# Patient Record
Sex: FEMALE | Race: WHITE | Employment: OTHER | URBAN - METROPOLITAN AREA
[De-identification: names, ages, dates, MRNs, and addresses within clinical notes are randomized per-mention and may not be internally consistent; named-entity substitution may affect disease eponyms.]

---

## 2018-01-28 PROBLEM — M25.551 PAIN OF RIGHT HIP JOINT: Status: ACTIVE | Noted: 2018-01-28

## 2018-01-28 PROBLEM — M79.604 RIGHT LEG PAIN: Status: ACTIVE | Noted: 2018-01-28

## 2018-01-28 PROBLEM — M25.561 ACUTE PAIN OF RIGHT KNEE: Status: ACTIVE | Noted: 2018-01-28

## 2018-11-15 ENCOUNTER — HOSPITAL ENCOUNTER (OUTPATIENT)
Age: 78
Discharge: HOME | End: 2018-11-17
Payer: MEDICARE

## 2018-11-15 LAB
ALBUMIN SERPL-MCNC: 4.1 G/DL (ref 3.5–5.2)
ALP BLD-CCNC: 138 U/L (ref 35–104)
ALT SERPL-CCNC: 33 U/L (ref 0–32)
ANION GAP SERPL CALCULATED.3IONS-SCNC: 15 MMOL/L (ref 7–16)
AST SERPL-CCNC: 41 U/L (ref 0–31)
BILIRUB SERPL-MCNC: 1.3 MG/DL (ref 0–1.2)
BUN BLDV-MCNC: 17 MG/DL (ref 8–23)
CALCIUM SERPL-MCNC: 9.4 MG/DL (ref 8.6–10.2)
CHLORIDE BLD-SCNC: 102 MMOL/L (ref 98–107)
CHOLESTEROL, TOTAL: 110 MG/DL (ref 0–199)
CO2: 25 MMOL/L (ref 22–29)
CREAT SERPL-MCNC: 1.3 MG/DL (ref 0.5–1)
GFR AFRICAN AMERICAN: 48
GFR NON-AFRICAN AMERICAN: 40 ML/MIN/1.73
GLUCOSE BLD-MCNC: 135 MG/DL (ref 74–99)
HBA1C MFR BLD: 7.8 % (ref 4–5.6)
HDLC SERPL-MCNC: 31 MG/DL
LDL CHOLESTEROL CALCULATED: 48 MG/DL (ref 0–99)
POTASSIUM SERPL-SCNC: 4.8 MMOL/L (ref 3.5–5)
SODIUM BLD-SCNC: 142 MMOL/L (ref 132–146)
TOTAL PROTEIN: 7.3 G/DL (ref 6.4–8.3)
TRIGL SERPL-MCNC: 154 MG/DL (ref 0–149)
VLDLC SERPL CALC-MCNC: 31 MG/DL

## 2018-11-15 PROCEDURE — 80061 LIPID PANEL: CPT

## 2018-11-15 PROCEDURE — 80053 COMPREHEN METABOLIC PANEL: CPT

## 2018-11-15 PROCEDURE — 83036 HEMOGLOBIN GLYCOSYLATED A1C: CPT

## 2019-05-10 ENCOUNTER — HOSPITAL ENCOUNTER (OUTPATIENT)
Age: 79
Discharge: HOME | End: 2019-05-12
Payer: MEDICARE

## 2019-05-10 PROCEDURE — 80053 COMPREHEN METABOLIC PANEL: CPT

## 2019-05-10 PROCEDURE — 80061 LIPID PANEL: CPT

## 2019-05-10 PROCEDURE — 83036 HEMOGLOBIN GLYCOSYLATED A1C: CPT

## 2019-05-10 PROCEDURE — 84443 ASSAY THYROID STIM HORMONE: CPT

## 2019-05-10 PROCEDURE — 85025 COMPLETE CBC W/AUTO DIFF WBC: CPT

## 2019-06-06 ENCOUNTER — HOSPITAL ENCOUNTER (EMERGENCY)
Age: 79
Discharge: HOME | End: 2019-06-07
Attending: EMERGENCY MEDICINE
Payer: MEDICARE

## 2019-06-06 ENCOUNTER — APPOINTMENT (OUTPATIENT)
Dept: GENERAL RADIOLOGY | Age: 79
End: 2019-06-06
Payer: MEDICARE

## 2019-06-06 VITALS
HEIGHT: 62 IN | TEMPERATURE: 98 F | HEART RATE: 68 BPM | DIASTOLIC BLOOD PRESSURE: 74 MMHG | OXYGEN SATURATION: 94 % | BODY MASS INDEX: 36.8 KG/M2 | WEIGHT: 200 LBS | SYSTOLIC BLOOD PRESSURE: 152 MMHG | RESPIRATION RATE: 18 BRPM

## 2019-06-06 DIAGNOSIS — S42.201A CLOSED FRACTURE OF PROXIMAL END OF RIGHT HUMERUS, UNSPECIFIED FRACTURE MORPHOLOGY, INITIAL ENCOUNTER: ICD-10-CM
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
## 2019-06-06 PROCEDURE — 73564 X-RAY EXAM KNEE 4 OR MORE: CPT

## 2019-06-06 PROCEDURE — 73090 X-RAY EXAM OF FOREARM: CPT

## 2019-06-06 PROCEDURE — 99284 EMERGENCY DEPT VISIT MOD MDM: CPT

## 2019-06-06 PROCEDURE — 73552 X-RAY EXAM OF FEMUR 2/>: CPT

## 2019-06-06 PROCEDURE — 73502 X-RAY EXAM HIP UNI 2-3 VIEWS: CPT

## 2019-06-06 PROCEDURE — 73080 X-RAY EXAM OF ELBOW: CPT

## 2019-06-06 PROCEDURE — 73030 X-RAY EXAM OF SHOULDER: CPT

## 2019-06-06 PROCEDURE — 71045 X-RAY EXAM CHEST 1 VIEW: CPT

## 2019-06-06 PROCEDURE — 73060 X-RAY EXAM OF HUMERUS: CPT

## 2019-06-06 RX ORDER — ACETAMINOPHEN 325 MG/1
650 TABLET ORAL EVERY 6 HOURS PRN
Qty: 120 TABLET | Refills: 3 | Status: SHIPPED | OUTPATIENT
Start: 2019-06-06

## 2019-06-06 RX ORDER — GLIPIZIDE 10 MG/1
10 TABLET ORAL
COMMUNITY

## 2019-06-06 RX ORDER — GABAPENTIN 300 MG/1
300 CAPSULE ORAL 2 TIMES DAILY
COMMUNITY

## 2019-06-06 ASSESSMENT — PAIN DESCRIPTION - PAIN TYPE: TYPE: ACUTE PAIN

## 2019-06-06 ASSESSMENT — PAIN SCALES - GENERAL: PAINLEVEL_OUTOF10: 8

## 2019-06-06 ASSESSMENT — PAIN DESCRIPTION - ORIENTATION: ORIENTATION: RIGHT

## 2019-06-06 ASSESSMENT — PAIN DESCRIPTION - LOCATION: LOCATION: ARM

## 2019-06-06 ASSESSMENT — PAIN DESCRIPTION - DESCRIPTORS: DESCRIPTORS: ACHING;SHARP;THROBBING

## 2019-06-06 ASSESSMENT — PAIN DESCRIPTION - FREQUENCY: FREQUENCY: CONTINUOUS

## 2019-06-07 NOTE — ED PROVIDER NOTES
Department of Emergency Medicine   ED  Provider Note  Admit Date/RoomTime: 6/6/2019  9:30 PM  ED Room: CYNDI/CYNDI                  HPI:  6/7/19, Time: 2:54 AM  .       Maya Madden is a 66 y.o. female presenting to the ED as a fall from home, brought in by family. Pt has prior history of CVA with decreased sensation and no meaningful motor function of the right arm. She is able to ambulate but has fragile stability on her right leg which at baseline is very weak per son and daughter. At 4 PM today pt had a fall in her bathroom heard by her . Pt states she tripped and fell onto her right side. She denies hitting her head, denies LOC, denies thinners. Son reports his father told him she was awake, alert, and at her neurologic baseline when he quickly was at her side. She was complaining of right arm pain and right hip pain. She states her pain is moderate. Denies new numbness or tingling in the right arm and family notes at baseline pt has minimal use of the arm. This patients disposition will be determined by trauma services. Glascow Coma Scale at time of initial examination  Best Eye Response 4 - Opens eyes on own   Best Verbal Response 4 - Seems confused, disoriented (AO x 1 only but this baseline per family)   Best Motor Response 6 - Follows simple motor commands   Total 14         Review of Systems:   Pertinent positives and negatives are stated within HPI, all other systems reviewed and are negative.              --------------------------------------------- PAST HISTORY ---------------------------------------------  Past Medical History:  has a past medical history of Cerebral artery occlusion with [facility] CHF (congestive heart failure) ([facility] Diabetes mellitus ([facility] Hypertension, and MRSA (methicillin resistant staph aureus) culture positive. Past Surgical History:  has a past surgical history that includes Cholecystectomy and Cardiac surgery.     Social History:  reports that she has never smoked. She has never used smokeless tobacco. She reports that she does not drink alcohol or use drugs. Family History: family history is not on file. The patients home medications have been reviewed. Allergies: Pcn [penicillins]; Shellfish-derived products; and Sulfa antibiotics            ------------------------- NURSING NOTES AND VITALS REVIEWED ---------------------------   The nursing notes within the ED encounter and vital signs as below have been reviewed. BP (!) 152/74   Pulse 68   Temp 98 °F (36.7 °C) (Oral)   Resp 18   Ht 5' 2\" (1.575 m)   Wt 200 lb (90.7 kg)   SpO2 94%   BMI 36.58 kg/m²   Oxygen Saturation Interpretation: Normal    The patients available past medical records and past encounters were reviewed. -------------------------------------------------- RESULTS -------------------------------------------------  All laboratory and radiology tests have been reviewed by myself  LABS:  No results found for this visit on 06/06/19. RADIOLOGY:  Interpreted by Radiologist.  XR KNEE RIGHT (MIN 4 VIEWS)   Final Result   Posterior to changes in the right knee. No right knee   joint effusion. No acute fractures or dislocation in the right knee. XR HUMERUS RIGHT (MIN 2 VIEWS)   Final Result   Acute comminuted mildly impacted fracture proximal right   tibial shaft extending subcapital regions. XR RADIUS ULNA RIGHT (2 VIEWS)   Final Result   Unremarkable x-ray series of the right radius and ulna. The present study does not evaluate the entire the elbow joint or of   the recent joint. XR SHOULDER RIGHT (MIN 2 VIEWS)   Final Result   1. Acute comminuted the mildly complex fracture of the proximal right   humeral shaft extending subcapital regions. 2. As cannot see well the position of the humeral head in relation to   the glenoid can correlate with a CT scan of the right shoulder. ALERT:  ABNORMAL REPORT.       XR FEMUR RIGHT (MIN 2 VIEWS)   Final Result   Previous ORIF for a now healed fracture of the intertrochanteric   region. Orthopedic hardware is intact and there is no loosening. No new acute fractures in the right femur. XR HIP RIGHT (2-3 VIEWS)   Final Result   No acute fractures or dislocation of the right hip joint. XR ELBOW RIGHT (MIN 3 VIEWS)   Final Result      1. Limited exam for detection of nondisplaced fracture due to   generalized osteopenia. 2. No obvious displaced fracture. If clinically warranted, CT may be   helpful for further evaluation. XR CHEST 1 VW   Final Result      No airspace opacities or pleural effusion.                    ---------------------------------------------------PHYSICAL EXAM--------------------------------------      Primary Survey:  Airway: patient, trachea midline,   Breathing: Spontaneous, breath sounds equal bilaterally, symmetric chest rise  Circulation: 2+ femoral pulses, 2+ DP/PT pulses  Disability: GCS 14 (at baseline per family)      Constitutional/General: Alert and oriented x self only, baseline per family  Head: Normocephalic, atraumatic  Eyes: PERRL, EOMI, globes intact, no hyphema, no evidence of entrapment, conjunctiva pink  Ears: TMs clear with no hemotympanum  Mouth: Oropharynx clear, handling secretions, no trismus. No dental trauma, no oral trauma  Neck:  No crepitus, no lacerations, abrasions, deformities, or stepoffs. Back: No midline cervical, thoracic, lumbar spine tenderness. No Stepoffs, abrasions, lacerations, or deformities. Pulmonary: Lungs clear to auscultation bilaterally, no wheezes, rales, or rhonchi. Not in respiratory distress  Cardiovascular:  Regular rate and rhythm, no murmurs, gallops, or rubs. 2+ distal pulses  Chest: no chest wall tenderness, no crepitus  Abdomen: Soft, non tender, non distended, +BS, no rebound, guarding, or rigidity. No pulsatile masses appreciated  Extremities: Moves left extremities well. Warm and well perfused, no clubbing, cyanosis, or edema. Capillary refill <3 seconds  -RUE - pt has only 1/5 strength in RUE, baseline per family, moderate TTP prox arm, limited ROM of RUE 2/2 muscle contracture and pain, decreased sensation throughout which is also baseline per family, 2+ radial pulse, mild tenderness to deep palpation of the right elbow and prox forearm, no TTP wrist or snuffbox  -RLE mild TTP knee, 4+/5 strength in RLE, otherwise grossly normal RLE exam      Skin: warm and dry without rash  Neurologic: GCS 14 (baseline), CN 2-12 grossly intact, no focal deficits, symmetric strength 5/5 in the upper and lower extremities bilaterally  Psych: Normal Affect    Trauma Evaluation/Survey Conducted in accordance with ATLS Guidelines      ------------------------------ ED COURSE/MEDICAL DECISION MAKING----------------------  Medications - No data to display      Medical Decision Making:    Pt had fall from standing as described above. At neurologic baseline per family. Complaining of various MSK pain. Concern for right elbow/humeral fracture based on severity of pain with palpation. Patient with x-rays of the right knee, right humerus, right radius and ulna, right shoulder, 1 view chest, right hip including pelvis since she fell on her right side and this completed partial trauma workup. Pt has multiple chronic findings and sub optimal images due to inability to move RUE at baseline. XR right humerus showed  Acute comminuted mildly impacted fracture proximal right   tibial shaft extending subcapital regions. Pt is in minimal pain. Continues to smile and be in no acute distress. I had a discussion with the family regarding potentially calling orthopedics or during the patient hospital given that she is frail, had a fall, and now has an arm fracture. They state that because she minimally uses his right arm at baseline, and her  takes care of her at home for the patient's stay at home. They're agreeable to call orthopedics tomorrow and arrange follow-up. Given that her pain is under control, and she had minimal use of this arm already I am agreeable to this plan. Patient was given a sling here, Tylenol for home, orthopedics follow-up. Encouraged to return if symptoms worsen. This patient's ED course included: a personal history and physicial examination, re-evaluation prior to disposition, multiple bedside re-evaluations and complex medical decision making and emergency management    This patient has remained hemodynamically stable during their ED course. Counseling: The emergency provider has spoken with the patient and daughter and mother and discussed todays results, in addition to providing specific details for the plan of care and counseling regarding the diagnosis and prognosis. Questions are answered at this time and they are agreeable with the plan.       --------------------------------- IMPRESSION AND DISPOSITION ---------------------------------    IMPRESSION  1. Fall from standing, initial encounter    2.  Closed fracture of proximal end of right humerus, unspecified fracture morphology, initial encounter        DISPOSITION  Disposition: as per consultation   Patient condition is stable           Sheree Hall MD  06/07/19 1158

## 2019-06-14 ENCOUNTER — TELEPHONE (OUTPATIENT)
Dept: ORTHOPEDIC SURGERY | Age: 79
End: 2019-06-14

## 2019-06-14 DIAGNOSIS — T14.8XXA FRACTURE: Primary | ICD-10-CM

## 2019-10-23 ENCOUNTER — HOSPITAL ENCOUNTER (OUTPATIENT)
Age: 79
Discharge: HOME | End: 2019-10-25
Payer: MEDICARE

## 2019-10-23 LAB
ALBUMIN SERPL-MCNC: 3.8 G/DL (ref 3.5–5.2)
ALP BLD-CCNC: 137 U/L (ref 35–104)
ALT SERPL-CCNC: 35 U/L (ref 0–32)
ANION GAP SERPL CALCULATED.3IONS-SCNC: 13 MMOL/L (ref 7–16)
AST SERPL-CCNC: 38 U/L (ref 0–31)
BILIRUB SERPL-MCNC: 0.9 MG/DL (ref 0–1.2)
BUN BLDV-MCNC: 23 MG/DL (ref 8–23)
CALCIUM SERPL-MCNC: 9.7 MG/DL (ref 8.6–10.2)
CHLORIDE BLD-SCNC: 98 MMOL/L (ref 98–107)
CHOLESTEROL, TOTAL: 144 MG/DL (ref 0–199)
CO2: 27 MMOL/L (ref 22–29)
CREAT SERPL-MCNC: 1.2 MG/DL (ref 0.5–1)
GFR AFRICAN AMERICAN: 52
GFR NON-AFRICAN AMERICAN: 43 ML/MIN/1.73
GLUCOSE BLD-MCNC: 88 MG/DL (ref 74–99)
HBA1C MFR BLD: 7.8 % (ref 4–5.6)
HDLC SERPL-MCNC: 46 MG/DL
LDL CHOLESTEROL CALCULATED: 74 MG/DL (ref 0–99)
POTASSIUM SERPL-SCNC: 4.7 MMOL/L (ref 3.5–5)
SODIUM BLD-SCNC: 138 MMOL/L (ref 132–146)
TOTAL PROTEIN: 7.8 G/DL (ref 6.4–8.3)
TRIGL SERPL-MCNC: 121 MG/DL (ref 0–149)
VLDLC SERPL CALC-MCNC: 24 MG/DL

## 2019-10-23 PROCEDURE — 80061 LIPID PANEL: CPT

## 2019-10-23 PROCEDURE — 83036 HEMOGLOBIN GLYCOSYLATED A1C: CPT

## 2019-10-23 PROCEDURE — 80053 COMPREHEN METABOLIC PANEL: CPT

## 2021-01-01 ENCOUNTER — HOSPITAL ENCOUNTER (EMERGENCY)
Age: 81
Discharge: HOME | End: 2021-12-30
Attending: EMERGENCY MEDICINE
Payer: MEDICARE

## 2021-01-01 ENCOUNTER — APPOINTMENT (OUTPATIENT)
Dept: GENERAL RADIOLOGY | Age: 81
End: 2021-01-01
Payer: MEDICARE

## 2021-01-01 VITALS
DIASTOLIC BLOOD PRESSURE: 61 MMHG | RESPIRATION RATE: 20 BRPM | HEART RATE: 58 BPM | TEMPERATURE: 98.1 F | SYSTOLIC BLOOD PRESSURE: 123 MMHG | WEIGHT: 180 LBS | HEIGHT: 63 IN | OXYGEN SATURATION: 91 % | BODY MASS INDEX: 31.89 KG/M2

## 2021-01-01 DIAGNOSIS — R73.9 HYPERGLYCEMIA: ICD-10-CM

## 2021-01-01 DIAGNOSIS — I50.9 ACUTE ON CHRONIC CONGESTIVE HEART FAILURE, UNSPECIFIED HEART FAILURE TYPE (HCC): ICD-10-CM

## 2021-01-01 DIAGNOSIS — N17.9 AKI (ACUTE KIDNEY INJURY) (HCC): ICD-10-CM

## 2021-01-01 DIAGNOSIS — U07.1 COVID-19 VIRUS INFECTION: Primary | ICD-10-CM

## 2021-01-01 LAB
ALBUMIN SERPL-MCNC: 4.1 G/DL (ref 3.5–5.2)
ALP BLD-CCNC: 106 U/L (ref 35–104)
ALT SERPL-CCNC: 31 U/L (ref 0–32)
ANION GAP SERPL CALCULATED.3IONS-SCNC: 16 MMOL/L (ref 7–16)
AST SERPL-CCNC: 45 U/L (ref 0–31)
BASOPHILS ABSOLUTE: 0.05 E9/L (ref 0–0.2)
BASOPHILS RELATIVE PERCENT: 0.6 % (ref 0–2)
BILIRUB SERPL-MCNC: 1.3 MG/DL (ref 0–1.2)
BUN BLDV-MCNC: 25 MG/DL (ref 6–23)
CALCIUM SERPL-MCNC: 9.5 MG/DL (ref 8.6–10.2)
CHLORIDE BLD-SCNC: 95 MMOL/L (ref 98–107)
CO2: 21 MMOL/L (ref 22–29)
CREAT SERPL-MCNC: 1.7 MG/DL (ref 0.5–1)
EKG ATRIAL RATE: 69 BPM
EKG P AXIS: 76 DEGREES
EKG P-R INTERVAL: 252 MS
EKG Q-T INTERVAL: 460 MS
EKG QRS DURATION: 88 MS
EKG QTC CALCULATION (BAZETT): 492 MS
EKG R AXIS: 0 DEGREES
EKG T AXIS: -47 DEGREES
EKG VENTRICULAR RATE: 69 BPM
EOSINOPHILS ABSOLUTE: 0.04 E9/L (ref 0.05–0.5)
EOSINOPHILS RELATIVE PERCENT: 0.5 % (ref 0–6)
GFR AFRICAN AMERICAN: 35
GFR NON-AFRICAN AMERICAN: 29 ML/MIN/1.73
GLUCOSE BLD-MCNC: 206 MG/DL (ref 74–99)
HCT VFR BLD CALC: 44.5 % (ref 34–48)
HEMOGLOBIN: 14.8 G/DL (ref 11.5–15.5)
IMMATURE GRANULOCYTES #: 0.04 E9/L
IMMATURE GRANULOCYTES %: 0.5 % (ref 0–5)
INFLUENZA A BY PCR: NOT DETECTED
INFLUENZA B BY PCR: NOT DETECTED
LYMPHOCYTES ABSOLUTE: 3.13 E9/L (ref 1.5–4)
LYMPHOCYTES RELATIVE PERCENT: 39.1 % (ref 20–42)
MCH RBC QN AUTO: 30.7 PG (ref 26–35)
MCHC RBC AUTO-ENTMCNC: 33.3 % (ref 32–34.5)
MCV RBC AUTO: 92.3 FL (ref 80–99.9)
MONOCYTES ABSOLUTE: 1.01 E9/L (ref 0.1–0.95)
MONOCYTES RELATIVE PERCENT: 12.6 % (ref 2–12)
NEUTROPHILS ABSOLUTE: 3.73 E9/L (ref 1.8–7.3)
NEUTROPHILS RELATIVE PERCENT: 46.7 % (ref 43–80)
PDW BLD-RTO: 14.3 FL (ref 11.5–15)
PLATELET # BLD: 136 E9/L (ref 130–450)
PMV BLD AUTO: 11.7 FL (ref 7–12)
POTASSIUM REFLEX MAGNESIUM: 4.5 MMOL/L (ref 3.5–5)
PRO-BNP: 2652 PG/ML (ref 0–450)
RBC # BLD: 4.82 E12/L (ref 3.5–5.5)
SARS-COV-2, NAAT: DETECTED
SODIUM BLD-SCNC: 132 MMOL/L (ref 132–146)
TOTAL PROTEIN: 7.5 G/DL (ref 6.4–8.3)
WBC # BLD: 8 E9/L (ref 4.5–11.5)

## 2021-01-01 PROCEDURE — 99283 EMERGENCY DEPT VISIT LOW MDM: CPT

## 2021-01-01 PROCEDURE — 36415 COLL VENOUS BLD VENIPUNCTURE: CPT

## 2021-01-01 PROCEDURE — 83880 ASSAY OF NATRIURETIC PEPTIDE: CPT

## 2021-01-01 PROCEDURE — 85025 COMPLETE CBC W/AUTO DIFF WBC: CPT

## 2021-01-01 PROCEDURE — 93005 ELECTROCARDIOGRAM TRACING: CPT | Performed by: EMERGENCY MEDICINE

## 2021-01-01 PROCEDURE — 71045 X-RAY EXAM CHEST 1 VIEW: CPT

## 2021-01-01 PROCEDURE — 87502 INFLUENZA DNA AMP PROBE: CPT

## 2021-01-01 PROCEDURE — 96374 THER/PROPH/DIAG INJ IV PUSH: CPT

## 2021-01-01 PROCEDURE — 87635 SARS-COV-2 COVID-19 AMP PRB: CPT

## 2021-01-01 PROCEDURE — 6360000002 HC RX W HCPCS: Performed by: EMERGENCY MEDICINE

## 2021-01-01 PROCEDURE — 80053 COMPREHEN METABOLIC PANEL: CPT

## 2021-01-01 PROCEDURE — 6370000000 HC RX 637 (ALT 250 FOR IP): Performed by: EMERGENCY MEDICINE

## 2021-01-01 RX ORDER — FUROSEMIDE 10 MG/ML
20 INJECTION INTRAMUSCULAR; INTRAVENOUS ONCE
Status: COMPLETED | OUTPATIENT
Start: 2021-01-01 | End: 2021-01-01

## 2021-01-01 RX ORDER — ACETAMINOPHEN 500 MG
1000 TABLET ORAL ONCE
Status: COMPLETED | OUTPATIENT
Start: 2021-01-01 | End: 2021-01-01

## 2021-01-01 RX ADMIN — FUROSEMIDE 20 MG: 10 INJECTION, SOLUTION INTRAMUSCULAR; INTRAVENOUS at 01:03

## 2021-01-01 RX ADMIN — ACETAMINOPHEN 1000 MG: 500 TABLET ORAL at 21:43

## 2021-01-01 ASSESSMENT — PAIN SCALES - GENERAL: PAINLEVEL_OUTOF10: 0

## 2021-12-30 NOTE — ED NOTES
[facility] contacted for transfer to [facility] per family request as established patient.       Trevor Sullivan RN  12/29/21 5500

## 2021-12-30 NOTE — ED PROVIDER NOTES
HPI:  12/29/21, Time: 9:09 PM SIS Sterling is a 80 y.o. female presenting to the ED for cough beginning 1 week ago. Symptoms have been moderate in severity and intermittent. No exacerbating or alleviating factors. Patient has a history of dementia. She presents with a family member who helps to provide history. She states she is at her neurologic baseline. She states they took her home COVID-19 test today which was positive. She has received 2 vaccinations for COVID-19. Patient also states that she has had a decreased appetite. She denies chest pain, shortness of breath, abdominal pain, vomiting, or syncope. She has chronic diarrhea due to her prior cholecystectomy which is unchanged. Patient has a history of pulmonary fibrosis, and she follows with Dr. Odin Roe. Family states that she also had a nodule on her lung which is currently being watched for possible cancer. Patient's daughter states that her cardiologist at [facility] has recently been cutting back on her Lasix due to worsening kidney function. Patient does have a history of CHF. Review of Systems:   Pertinent positives and negatives are stated within HPI, all other systems reviewed and are negative.          --------------------------------------------- PAST HISTORY ---------------------------------------------  Past Medical History:  has a past medical history of Cerebral [facility] CHF (congestive heart failure) ([facility] Diabetes mellitus ([facility] Hypertension, and MRSA (methicillin resistant staph aureus) culture positive. Past Surgical History:  has a past surgical history that includes Cholecystectomy and Cardiac surgery. Social History:  reports that she has never smoked. She has never used smokeless tobacco. She reports that she does not drink alcohol and does not use drugs. Family History: family history is not on file.      The patients home medications have been reviewed.     Allergies: Pcn [penicillins], Shellfish-derived products, and Sulfa antibiotics    -------------------------------------------------- RESULTS -------------------------------------------------  All laboratory and radiology results have been personally reviewed by myself   LABS:  Results for orders placed or performed during the hospital encounter of 12/29/21   COVID-19, Rapid    Specimen: Nasopharyngeal Swab   Result Value Ref Range    SARS-CoV-2, NAAT DETECTED (A) Not Detected   RAPID INFLUENZA A/B ANTIGENS    Specimen: Nasopharyngeal   Result Value Ref Range    Influenza A by PCR Not Detected Not Detected    Influenza B by PCR Not Detected Not Detected   CBC Auto Differential   Result Value Ref Range    WBC 8.0 4.5 - 11.5 E9/L    RBC 4.82 3.50 - 5.50 E12/L    Hemoglobin 14.8 11.5 - 15.5 g/dL    Hematocrit 44.5 34.0 - 48.0 %    MCV 92.3 80.0 - 99.9 fL    MCH 30.7 26.0 - 35.0 pg    MCHC 33.3 32.0 - 34.5 %    RDW 14.3 11.5 - 15.0 fL    Platelets 785 223 - 428 E9/L    MPV 11.7 7.0 - 12.0 fL    Neutrophils % 46.7 43.0 - 80.0 %    Immature Granulocytes % 0.5 0.0 - 5.0 %    Lymphocytes % 39.1 20.0 - 42.0 %    Monocytes % 12.6 (H) 2.0 - 12.0 %    Eosinophils % 0.5 0.0 - 6.0 %    Basophils % 0.6 0.0 - 2.0 %    Neutrophils Absolute 3.73 1.80 - 7.30 E9/L    Immature Granulocytes # 0.04 E9/L    Lymphocytes Absolute 3.13 1.50 - 4.00 E9/L    Monocytes Absolute 1.01 (H) 0.10 - 0.95 E9/L    Eosinophils Absolute 0.04 (L) 0.05 - 0.50 E9/L    Basophils Absolute 0.05 0.00 - 0.20 E9/L   Comprehensive Metabolic Panel w/ Reflex to MG   Result Value Ref Range    Sodium 132 132 - 146 mmol/L    Potassium reflex Magnesium 4.5 3.5 - 5.0 mmol/L    Chloride 95 (L) 98 - 107 mmol/L    CO2 21 (L) 22 - 29 mmol/L    Anion Gap 16 7 - 16 mmol/L    Glucose 206 (H) 74 - 99 mg/dL    BUN 25 (H) 6 - 23 mg/dL    CREATININE 1.7 (H) 0.5 - 1.0 mg/dL    GFR Non-African American 29 >=60 mL/min/1.73    GFR African American 35     Calcium 9.5 8.6 - 10.2 mg/dL    Total Protein 7.5 6.4 - 8.3 g/dL    Albumin 4.1 3.5 - 5.2 g/dL    Total Bilirubin 1.3 (H) 0.0 - 1.2 mg/dL    Alkaline Phosphatase 106 (H) 35 - 104 U/L    ALT 31 0 - 32 U/L    AST 45 (H) 0 - 31 U/L   EKG 12 Lead   Result Value Ref Range    Ventricular Rate 69 BPM    Atrial Rate 69 BPM    P-R Interval 252 ms    QRS Duration 88 ms    Q-T Interval 460 ms    QTc Calculation (Bazett) 492 ms    P Axis 76 degrees    R Axis 0 degrees    T Axis -47 degrees       RADIOLOGY:  Interpreted by Radiologist.  XR CHEST PORTABLE   Final Result   Cardiomegaly and suspected pulmonary vascular congestion.             ------------------------- NURSING NOTES AND VITALS REVIEWED ---------------------------   The nursing notes within the ED encounter and vital signs as below have been reviewed. /61   Pulse 58   Temp 98.1 °F (36.7 °C) (Temporal)   Resp 20   Ht 5' 3\" (1.6 m)   Wt 180 lb (81.6 kg)   SpO2 91%   BMI 31.89 kg/m²   Oxygen Saturation Interpretation: Normal      ---------------------------------------------------PHYSICAL EXAM--------------------------------------      Constitutional/General: Alert, well appearing, non toxic in NAD  Head: Normocephalic and atraumatic  Eyes: EOMI  Mouth: Oropharynx clear, handling secretions, no trismus  Neck: Supple, full ROM,   Pulmonary: Lungs clear to auscultation bilaterally, no wheezes, rales, or rhonchi. Not in respiratory distress  Cardiovascular:  Regular rate and rhythm, no murmurs, gallops, or rubs. 2+ distal pulses  Abdomen: Soft, non tender, non distended,   Extremities: Moves all extremities x 4. Warm and well perfused  Skin: warm and dry without rash  Neurologic: Alert, answering questions, following commands, at neurologic baseline per family at bedside. No focal motor or sensory deficits   Psych: Normal Affect.  Behavior normal.      ------------------------------ ED COURSE/MEDICAL DECISION MAKING----------------------  Medications   furosemide (LASIX) injection 20 mg (has no administration in time range)   acetaminophen (TYLENOL) tablet 1,000 mg (1,000 mg Oral Given 12/29/21 2143)       Medical Decision Making/ED COURSE:   Patient is an 61-year-old female presenting with cough and likely COVID-19. She had a positive rapid test at home. In the ED, patient was hemodynamically stable and afebrile. She was saturating well on room air. On exam, she was nontoxic. She was at her neurologic baseline per family at bedside. Labs and CXR obtained. I reviewed and interpreted labs. Patient had a creatinine of 1.7 which is increased from her baseline. She has hyperglycemia of 206 but no anion gap to suggest DKA. She is positive for COVID-19. Chest x-ray shows some fluid overload. Patient's daughter states that her PCP has recently been cutting back on her Lasix due to worsening kidney function. I am concerned that the patient is having fluid overload, and she is having some borderline pulse ox readings in the ED. She will desat to 90% when she lays flat. She has been ordered 1 dose of Lasix here though we do need to be cautious due to her MARYSE. Patient's PCP was consulted. I discussed findings and plan of care. Patient has been able to maintain her oxygen saturations at or above 90%. She is not in acute respiratory distress, and she has stable vital signs. Plan for discharge home with close outpatient follow-up. Strict ED return precautions discussed with family member at bedside. Patient remained hemodynamically stable throughout ED course. ED Course as of 12/30/21 0018   Wed Dec 29, 2021   2120 EKG: This EKG is signed and interpreted by me.     Rate: 69  Rhythm: Sinus  Interpretation: Normal sinus rhythm, first-degree AV block, normal QRS, normal QT interval, nonspecific T wave flattening though there is extensive motion artifact  Comparison: no previous EKG available     [JA]   2670 Patient is nonambulatory at baseline so unable to obtain ambulatory pulse ox. [JA]   18 Patient's family is requesting [facility] because that is where she receives all of her care. [JA]   6231 I spoke with the access center. [facility]  will not accept the patient. [JA]   Thu Dec 30, 2021   0000 PCP was consulted. I spoke with Dr. Tyler Black regarding possible admission. He does not believe patient meets inpatient criteria. He will follow up with the patient closely. He asks that the patient call the office Monday. [JA]   0016 On multiple reevaluations, patient has maintained her oxygen saturations above 90%. I had extensive conversation with the family member at bedside regarding plan for close follow-up. I advised to return to the ED at any time if worsening or concerning symptoms. Patient has a pulse ox at home. Advised to continue to monitor oxygen saturations. [JA]      ED Course User Index  [JA] Carmen Barney MD           Counseling: The emergency provider has spoken with the patient and family and discussed todays results, in addition to providing specific details for the plan of care and counseling regarding the diagnosis and prognosis. Questions are answered at this time and they are agreeable with the plan.      --------------------------------- IMPRESSION AND DISPOSITION ---------------------------------    IMPRESSION  1. COVID-19 virus infection    2. Acute on chronic congestive heart failure, unspecified heart failure type ([facility]    3. [facility]ARYSE [facility]    4. Hyperglycemia        DISPOSITION  Disposition: Discharge to home  Patient condition is stable      NOTE: This report was transcribed using voice recognition software.  Every effort was made to ensure accuracy; however, inadvertent computerized transcription errors may be present    I, Carmen Barney MD, am the primary provider of this record       Carmen Barney MD  12/30/21 6795

## 2021-12-30 NOTE — ED NOTES
Patient unable to ambulate. Patient SpO2 is 91% on RA while laying flat.       Bethany Sierra, RN  12/29/21 [id number]

## 2022-01-01 ENCOUNTER — CARE COORDINATION (OUTPATIENT)
Dept: CARE COORDINATION | Age: 82
End: 2022-01-01

## 2022-01-01 ENCOUNTER — APPOINTMENT (OUTPATIENT)
Dept: GENERAL RADIOLOGY | Age: 82
End: 2022-01-01
Payer: MEDICARE

## 2022-01-01 ENCOUNTER — HOSPITAL ENCOUNTER (EMERGENCY)
Age: 82
Discharge: HOME | End: 2022-07-20
Attending: EMERGENCY MEDICINE
Payer: MEDICARE

## 2022-01-01 VITALS
HEIGHT: 63 IN | BODY MASS INDEX: 31.89 KG/M2 | HEART RATE: 65 BPM | WEIGHT: 180 LBS | TEMPERATURE: 98 F | OXYGEN SATURATION: 96 % | SYSTOLIC BLOOD PRESSURE: 106 MMHG | DIASTOLIC BLOOD PRESSURE: 84 MMHG | RESPIRATION RATE: 20 BRPM

## 2022-01-01 DIAGNOSIS — R41.82 ALTERED MENTAL STATUS, UNSPECIFIED ALTERED MENTAL STATUS TYPE: Primary | ICD-10-CM

## 2022-01-01 LAB
ALBUMIN SERPL-MCNC: 3.1 G/DL (ref 3.5–5.2)
ALP BLD-CCNC: 160 U/L (ref 35–104)
ALT SERPL-CCNC: 33 U/L (ref 0–32)
ANION GAP SERPL CALCULATED.3IONS-SCNC: 9 MMOL/L (ref 7–16)
AST SERPL-CCNC: 43 U/L (ref 0–31)
BACTERIA: ABNORMAL /HPF
BASOPHILS ABSOLUTE: 0.09 E9/L (ref 0–0.2)
BASOPHILS RELATIVE PERCENT: 0.8 % (ref 0–2)
BILIRUB SERPL-MCNC: 0.8 MG/DL (ref 0–1.2)
BILIRUBIN URINE: NEGATIVE
BLOOD, URINE: ABNORMAL
BUN BLDV-MCNC: 37 MG/DL (ref 6–23)
CALCIUM SERPL-MCNC: 9.3 MG/DL (ref 8.6–10.2)
CHLORIDE BLD-SCNC: 93 MMOL/L (ref 98–107)
CLARITY: CLEAR
CO2: 28 MMOL/L (ref 22–29)
COLOR: YELLOW
CREAT SERPL-MCNC: 1.6 MG/DL (ref 0.5–1)
EKG ATRIAL RATE: 66 BPM
EKG P AXIS: 82 DEGREES
EKG P-R INTERVAL: 214 MS
EKG Q-T INTERVAL: 500 MS
EKG QRS DURATION: 82 MS
EKG QTC CALCULATION (BAZETT): 524 MS
EKG R AXIS: 15 DEGREES
EKG T AXIS: 67 DEGREES
EKG VENTRICULAR RATE: 66 BPM
EOSINOPHILS ABSOLUTE: 0.42 E9/L (ref 0.05–0.5)
EOSINOPHILS RELATIVE PERCENT: 3.9 % (ref 0–6)
EPITHELIAL CELLS, UA: ABNORMAL /HPF
GFR AFRICAN AMERICAN: 37
GFR NON-AFRICAN AMERICAN: 31 ML/MIN/1.73
GLUCOSE BLD-MCNC: 336 MG/DL (ref 74–99)
GLUCOSE URINE: >=1000 MG/DL
HCT VFR BLD CALC: 42.9 % (ref 34–48)
HEMOGLOBIN: 13.9 G/DL (ref 11.5–15.5)
IMMATURE GRANULOCYTES #: 0.21 E9/L
IMMATURE GRANULOCYTES %: 1.9 % (ref 0–5)
KETONES, URINE: NEGATIVE MG/DL
LACTIC ACID: 2 MMOL/L (ref 0.5–2.2)
LEUKOCYTE ESTERASE, URINE: ABNORMAL
LYMPHOCYTES ABSOLUTE: 3.73 E9/L (ref 1.5–4)
LYMPHOCYTES RELATIVE PERCENT: 34.3 % (ref 20–42)
MCH RBC QN AUTO: 30.9 PG (ref 26–35)
MCHC RBC AUTO-ENTMCNC: 32.4 % (ref 32–34.5)
MCV RBC AUTO: 95.3 FL (ref 80–99.9)
METER GLUCOSE: 322 MG/DL (ref 74–99)
MONOCYTES ABSOLUTE: 0.74 E9/L (ref 0.1–0.95)
MONOCYTES RELATIVE PERCENT: 6.8 % (ref 2–12)
NEUTROPHILS ABSOLUTE: 5.69 E9/L (ref 1.8–7.3)
NEUTROPHILS RELATIVE PERCENT: 52.3 % (ref 43–80)
NITRITE, URINE: NEGATIVE
ORGANISM: ABNORMAL
PDW BLD-RTO: 14.6 FL (ref 11.5–15)
PH UA: 6 (ref 5–9)
PLATELET # BLD: 241 E9/L (ref 130–450)
PMV BLD AUTO: 11.8 FL (ref 7–12)
POTASSIUM REFLEX MAGNESIUM: 5.1 MMOL/L (ref 3.5–5)
PROTEIN UA: NEGATIVE MG/DL
RBC # BLD: 4.5 E12/L (ref 3.5–5.5)
RBC UA: ABNORMAL /HPF (ref 0–2)
SODIUM BLD-SCNC: 130 MMOL/L (ref 132–146)
SPECIFIC GRAVITY UA: <=1.005 (ref 1–1.03)
TOTAL PROTEIN: 6.5 G/DL (ref 6.4–8.3)
TROPONIN, HIGH SENSITIVITY: 44 NG/L (ref 0–9)
TROPONIN, HIGH SENSITIVITY: 46 NG/L (ref 0–9)
URINE CULTURE, ROUTINE: ABNORMAL
UROBILINOGEN, URINE: 0.2 E.U./DL
WBC # BLD: 10.9 E9/L (ref 4.5–11.5)
WBC UA: ABNORMAL /HPF (ref 0–5)
YEAST: PRESENT /HPF

## 2022-01-01 PROCEDURE — 82962 GLUCOSE BLOOD TEST: CPT

## 2022-01-01 PROCEDURE — 80053 COMPREHEN METABOLIC PANEL: CPT

## 2022-01-01 PROCEDURE — 36415 COLL VENOUS BLD VENIPUNCTURE: CPT

## 2022-01-01 PROCEDURE — 93005 ELECTROCARDIOGRAM TRACING: CPT | Performed by: EMERGENCY MEDICINE

## 2022-01-01 PROCEDURE — 2580000003 HC RX 258: Performed by: EMERGENCY MEDICINE

## 2022-01-01 PROCEDURE — 96361 HYDRATE IV INFUSION ADD-ON: CPT

## 2022-01-01 PROCEDURE — 85025 COMPLETE CBC W/AUTO DIFF WBC: CPT

## 2022-01-01 PROCEDURE — 99285 EMERGENCY DEPT VISIT HI MDM: CPT

## 2022-01-01 PROCEDURE — 84484 ASSAY OF TROPONIN QUANT: CPT

## 2022-01-01 PROCEDURE — 87088 URINE BACTERIA CULTURE: CPT

## 2022-01-01 PROCEDURE — 96360 HYDRATION IV INFUSION INIT: CPT

## 2022-01-01 PROCEDURE — 83605 ASSAY OF LACTIC ACID: CPT

## 2022-01-01 PROCEDURE — 6370000000 HC RX 637 (ALT 250 FOR IP): Performed by: EMERGENCY MEDICINE

## 2022-01-01 PROCEDURE — 81001 URINALYSIS AUTO W/SCOPE: CPT

## 2022-01-01 PROCEDURE — 71045 X-RAY EXAM CHEST 1 VIEW: CPT

## 2022-01-01 RX ORDER — GABAPENTIN 300 MG/1
300 CAPSULE ORAL 3 TIMES DAILY
Status: DISCONTINUED | OUTPATIENT
Start: 2022-01-01 | End: 2022-01-01

## 2022-01-01 RX ORDER — 0.9 % SODIUM CHLORIDE 0.9 %
1000 INTRAVENOUS SOLUTION INTRAVENOUS ONCE
Status: COMPLETED | OUTPATIENT
Start: 2022-01-01 | End: 2022-01-01

## 2022-01-01 RX ORDER — GABAPENTIN 300 MG/1
300 CAPSULE ORAL ONCE
Status: COMPLETED | OUTPATIENT
Start: 2022-01-01 | End: 2022-01-01

## 2022-01-01 RX ADMIN — GABAPENTIN 300 MG: 300 CAPSULE ORAL at 19:43

## 2022-01-01 RX ADMIN — SODIUM CHLORIDE 1000 ML: 9 INJECTION, SOLUTION INTRAVENOUS at 15:53

## 2022-01-01 RX ADMIN — SODIUM CHLORIDE 1000 ML: 9 INJECTION, SOLUTION INTRAVENOUS at 14:55

## 2022-01-01 ASSESSMENT — PAIN DESCRIPTION - FREQUENCY: FREQUENCY: CONTINUOUS

## 2022-01-01 ASSESSMENT — PAIN DESCRIPTION - LOCATION: LOCATION: GENERALIZED

## 2022-01-01 ASSESSMENT — PAIN SCALES - GENERAL: PAINLEVEL_OUTOF10: 2

## 2022-01-01 ASSESSMENT — PAIN - FUNCTIONAL ASSESSMENT: PAIN_FUNCTIONAL_ASSESSMENT: 0-10

## 2022-01-03 NOTE — CARE COORDINATION
Date/Time:  1/3/2022 1:16 PM  Attempted to reach patient by telephone. Left HIPAA compliant message requesting a return call. Will attempt to reach patient again.

## 2022-01-04 NOTE — CARE COORDINATION
Date/Time:  1/4/2022 11:41 AM  Attempted to reach patient by telephone. Left HIPAA compliant message requesting a return call. Unable to contact X2 attempts, no further outreach.

## 2022-07-19 NOTE — ED PROVIDER NOTES
HPI:  7/19/22, Time: 1:41 PM EDT         Belkis Luo is a 80 y.o. female presenting to the ED for change in mental status, beginning few hours ago. Patient is unable to give any history she was reportedly sent from nursing home after she was found slumped over and diaphoretic. There is no history of fall. No history of documented fever but she has been currently being treated for UTI per nursing home report. Further history is available. Review of Systems: Unavailable        --------------------------------------------- PAST HISTORY ---------------------------------------------  Past Medical History:  has a past medical history of Cerebral artery occlusion with cerebral infarction ([facility] CHF (congestive heart failure) ([facility] Diabetes mellitus ([facility] Hypertension, and MRSA (methicillin resistant staph aureus) culture positive. Past Surgical History:  has a past surgical history that includes Cholecystectomy and Cardiac surgery. Social History:  reports that she has never smoked. She has never used smokeless tobacco. She reports that she does not drink alcohol and does not use drugs. Family History: family history is not on file. The patients home medications have been reviewed.     Allergies: Pcn [penicillins], Shellfish-derived products, and Sulfa antibiotics    -------------------------------------------------- RESULTS -------------------------------------------------  All laboratory and radiology results have been personally reviewed by myself   LABS:  Results for orders placed or performed during the hospital encounter of 07/19/22   CBC with Auto Differential   Result Value Ref Range    WBC 10.9 4.5 - 11.5 E9/L    RBC 4.50 3.50 - 5.50 E12/L    Hemoglobin 13.9 11.5 - 15.5 g/dL    Hematocrit 42.9 34.0 - 48.0 %    MCV 95.3 80.0 - 99.9 fL    MCH 30.9 26.0 - 35.0 pg    MCHC 32.4 32.0 - 34.5 %    RDW 14.6 11.5 - 15.0 fL    Platelets 584 407 - 020 E9/L    MPV 11.8 7.0 - 12.0 fL    Neutrophils % 52.3 43.0 - 80.0 %    Immature Granulocytes % 1.9 0.0 - 5.0 %    Lymphocytes % 34.3 20.0 - 42.0 %    Monocytes % 6.8 2.0 - 12.0 %    Eosinophils % 3.9 0.0 - 6.0 %    Basophils % 0.8 0.0 - 2.0 %    Neutrophils Absolute 5.69 1.80 - 7.30 E9/L    Immature Granulocytes # 0.21 E9/L    Lymphocytes Absolute 3.73 1.50 - 4.00 E9/L    Monocytes Absolute 0.74 0.10 - 0.95 E9/L    Eosinophils Absolute 0.42 0.05 - 0.50 E9/L    Basophils Absolute 0.09 0.00 - 0.20 E9/L   Comprehensive Metabolic Panel w/ Reflex to MG   Result Value Ref Range    Sodium 130 (L) 132 - 146 mmol/L    Potassium reflex Magnesium 5.1 (H) 3.5 - 5.0 mmol/L    Chloride 93 (L) 98 - 107 mmol/L    CO2 28 22 - 29 mmol/L    Anion Gap 9 7 - 16 mmol/L    Glucose 336 (H) 74 - 99 mg/dL    BUN 37 (H) 6 - 23 mg/dL    CREATININE 1.6 (H) 0.5 - 1.0 mg/dL    GFR Non-African American 31 >=60 mL/min/1.73    GFR African American 37     Calcium 9.3 8.6 - 10.2 mg/dL    Total Protein 6.5 6.4 - 8.3 g/dL    Albumin 3.1 (L) 3.5 - 5.2 g/dL    Total Bilirubin 0.8 0.0 - 1.2 mg/dL    Alkaline Phosphatase 160 (H) 35 - 104 U/L    ALT 33 (H) 0 - 32 U/L    AST 43 (H) 0 - 31 U/L   Troponin   Result Value Ref Range    Troponin, High Sensitivity 46 (H) 0 - 9 ng/L   Urinalysis with Microscopic   Result Value Ref Range    Color, UA Yellow Straw/Yellow    Clarity, UA Clear Clear    Glucose, Ur >=1000 (A) Negative mg/dL    Bilirubin Urine Negative Negative    Ketones, Urine Negative Negative mg/dL    Specific Gravity, UA <=1.005 1.005 - 1.030    Blood, Urine SMALL (A) Negative    pH, UA 6.0 5.0 - 9.0    Protein, UA Negative Negative mg/dL    Urobilinogen, Urine 0.2 <2.0 E.U./dL    Nitrite, Urine Negative Negative    Leukocyte Esterase, Urine MODERATE (A) Negative    WBC, UA 10-20 (A) 0 - 5 /HPF    RBC, UA 1-3 0 - 2 /HPF    Epithelial Cells, UA FEW /HPF    Bacteria, UA MANY (A) None Seen /HPF    Yeast, UA Present (A) None Seen /HPF   Lactic Acid   Result Value Ref Range    Lactic Acid 2.0 0.5 - 2.2 mmol/L   Troponin   Result Value Ref Range    Troponin, High Sensitivity 44 (H) 0 - 9 ng/L   POCT Glucose   Result Value Ref Range    Meter Glucose 322 (H) 74 - 99 mg/dL   EKG 12 Lead   Result Value Ref Range    Ventricular Rate 66 BPM    Atrial Rate 66 BPM    P-R Interval 214 ms    QRS Duration 82 ms    Q-T Interval 500 ms    QTc Calculation (Bazett) 524 ms    P Axis 82 degrees    R Axis 15 degrees    T Axis 67 degrees       RADIOLOGY:  Interpreted by Radiologist.  XR CHEST PORTABLE   Final Result   Coarsened interstitial markings which likely represent fibrosis. See above.             ------------------------- NURSING NOTES AND VITALS REVIEWED ---------------------------   The nursing notes within the ED encounter and vital signs as below have been reviewed. /84   Pulse 65   Temp 98 °F (36.7 °C) (Axillary)   Resp 20   Ht 5' 3\" (1.6 m)   Wt 180 lb (81.6 kg)   SpO2 96%   BMI 31.89 kg/m²   Oxygen Saturation Interpretation: Normal      ---------------------------------------------------PHYSICAL EXAM--------------------------------------      Constitutional/General: Alert chronically ill-appearing, non toxic in NAD  Head: Normocephalic and atraumatic  Eyes: PERRL, EOMI  Mouth: Oropharynx clear, handling secretions, no trismus  Neck: Supple, full ROM,   Pulmonary: Lungs clear to auscultation bilaterally, no wheezes, rales, or rhonchi.  Not in respiratory distress  Cardiovascular:  Regular rate and rhythm,   Abdomen: Soft, non tender, non distended,   Extremities: Contracture of the right upper extremity warm and well perfused  Skin: warm and dry without rash  Neurologic: Alert answering a few questions limited exam due to mental status.    ------------------------------ ED COURSE/MEDICAL DECISION MAKING----------------------  Medications   0.9 % sodium chloride bolus (0 mLs IntraVENous Stopped 7/19/22 1600)   0.9 % sodium chloride bolus (0 mLs IntraVENous Stopped 7/19/22 1715)         ED COURSE:  ED Course as of 07/19/22 1719   Tue Jul 19, 2022   1418 EKG: Normal sinus rhythm at 66 poor R wave progression first-degree AV block no ST or T wave changes [GJ]   1550 Discussed with family member at the bedside she was slumped over in her wheelchair at the nursing home this morning and clammy and said she wanted to go to the hospital.  This time her labs are reasonable that she is perhaps a bit dehydrated we will give her more fluid her blood pressure but anticipate return to the nursing home family is okay with this plan. [GJ]      ED Course User Index  [GJ] Serge Sue MD       Medical Decision Making:    Sent from nursing home for change in mental status reportedly slumped over and diaphoretic while being treated recently for UTI. Labs x-ray EKG rule out sepsis. No report of new focal neurodeficit. Counseling: The emergency provider has spoken with the patient and discussed todays results, in addition to providing specific details for the plan of care and counseling regarding the diagnosis and prognosis. Questions are answered at this time and they are agreeable with the plan. Her blood pressure is improved following additional fluid she was discharged to the nursing home in stable condition.    --------------------------------- IMPRESSION AND DISPOSITION ---------------------------------    IMPRESSION  1. Altered mental status, unspecified altered mental status type Improving       DISPOSITION  Disposition: Discharge to nursing home  Patient condition is stable      NOTE: This report was transcribed using voice recognition software.  Every effort was made to ensure accuracy; however, inadvertent computerized transcription errors may be present       Serge Sue MD  07/19/22 [id number]

## 2022-07-20 NOTE — ED NOTES
Medical transport here for . Daughter Edith Chadwick called at [phone]. Message left.      Balbir Pinedo, RUBY  07/20/22 3814

## 2022-07-20 NOTE — ED NOTES
Pt brief changed, neo care performed. Pt repositioned in bed.      Vanessa Thornton, RN  07/19/22 [id number] [facility] Gregory, RUBY  07/19/22 3821

## 2022-07-20 NOTE — ED NOTES
Pts daughter Curlene Mcardle requesting we call her when pt is picked up for transport.  [address] [facility], RN  07/19/22 0532